# Patient Record
Sex: FEMALE | Race: WHITE | Employment: OTHER | URBAN - METROPOLITAN AREA
[De-identification: names, ages, dates, MRNs, and addresses within clinical notes are randomized per-mention and may not be internally consistent; named-entity substitution may affect disease eponyms.]

---

## 2020-07-01 ENCOUNTER — TELEPHONE (OUTPATIENT)
Dept: SURGERY | Facility: CLINIC | Age: 65
End: 2020-07-01

## 2020-07-01 DIAGNOSIS — Z95.828 MRI-SAFE ENDOVASCULAR ANEURYSM COIL PRESENT: ICD-10-CM

## 2020-07-01 DIAGNOSIS — I67.1 CEREBRAL ANEURYSM: Primary | ICD-10-CM

## 2020-07-01 NOTE — TELEPHONE ENCOUNTER
LOV 9/2016; had coiling; right eye blurry, blood behind the eye? Is this something Dr. Zion Felipe would follow for her?  Please advise

## 2020-07-01 NOTE — TELEPHONE ENCOUNTER
Per Dr. Julieta Lao on 9/13/16 at office visit:    [address] [facility]      1. 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   I have asked her to return to the office at the one-year level, at the end of April, with a rep

## 2020-07-01 NOTE — TELEPHONE ENCOUNTER
Spoke with phone # below provided by patient. Facility is [facility]. Fax #:[phone]    Order faxed to [facility].

## 2020-07-01 NOTE — TELEPHONE ENCOUNTER
Has she been evaluated by ophthalmology? She would be due for follow up MRA head with and without contrast.  Please follow up with patient and inquire if she has any imaging from [place].  I will place orders for MRA and patient can be scheduled for follow

## 2020-07-01 NOTE — TELEPHONE ENCOUNTER
Pt calling stating she'd like to go to [facility] in [facility], phone # [phone]

## 2020-07-01 NOTE — TELEPHONE ENCOUNTER
Called patient regarding information in message from ANI Mazariegos. Patient stated that:    -she did not obtain any imaging or have any testing completed in [place].    -she did want providers to know that she wakes up daily with vertigo/dizziness.

## 2020-07-06 NOTE — TELEPHONE ENCOUNTER
Received fax from [facility] stating: Our MRI Dept.  Said that this MRA should just be ordered without contrast. Can you please adjust the order to be without contrast?\"    Will forward the above message on to SUNIL Tai to see if the MRA order shou 10

## 2020-07-16 NOTE — TELEPHONE ENCOUNTER
Ongoing SW/CM Assessment/Plan of Care Note     See SW/CM flowsheets for goals and other objective data.    Patient/Family discharge goal (s):  Goal #1: Adjustment/coping issues addressed  Goal #2: Communication facilitated  Goal #3: Extended Care Facility discharge arranged    PT Recommendation:  Recommendation for Discharge: PT: Sub-acute nursing home, Less intensive rehab    OT Recommendation:  Recommendations for Discharge: OT: Sub-acute nursing home, Less intensive rehab    SLP Recommendation:  Recommendations for Discharge: SLP: Post [facility] services    Disposition:  Planned Discharge Destination: Rehabilitation/Skilled Care    Progress note:   Nephrology continues to follow patient.  Patient continues to requires supplemental magnesium, working with PT and OT. Tentative discharge back to [facility] [place] tomorrow.            Attempt made to call patient back. She may have imaging done at the location of her choice.   We are just waiting for IR to adjust imaging to state without contrast    Routed TE again to provider to adjust imaging order to state w/o contrast

## 2020-07-16 NOTE — TELEPHONE ENCOUNTER
Pt states she is unsure if MRA was schedule at [facility], if  is too much trouble pt states she can go foward with MRA here at [facility].

## 2020-07-29 ENCOUNTER — HOSPITAL ENCOUNTER (OUTPATIENT)
Dept: MRI IMAGING | Facility: HOSPITAL | Age: 65
Discharge: HOME | End: 2020-07-29
Attending: PHYSICIAN ASSISTANT
Payer: MEDICARE

## 2020-07-29 DIAGNOSIS — Z95.828 MRI-SAFE ENDOVASCULAR ANEURYSM COIL PRESENT: ICD-10-CM

## 2020-07-29 DIAGNOSIS — I67.1 CEREBRAL ANEURYSM: ICD-10-CM

## 2020-07-29 LAB — CREAT BLD-MCNC: 0.7 MG/DL (ref 0.55–1.02)

## 2020-07-29 PROCEDURE — A9575 INJ GADOTERATE MEGLUMI 0.1ML: HCPCS | Performed by: PHYSICIAN ASSISTANT

## 2020-07-29 PROCEDURE — 70546 MR ANGIOGRAPH HEAD W/O&W/DYE: CPT | Performed by: NURSE PRACTITIONER

## 2020-07-29 PROCEDURE — 82565 ASSAY OF CREATININE: CPT

## 2020-07-29 NOTE — TELEPHONE ENCOUNTER
Per provider in 7/29/20 message:    \"Katia Mariano Prom, APRN  [address] Nurse; [address] Front Office   Hi patient completed MRA brain.  She needs follow up with Dr. Carlos Timmons to review results.  Thanks\"

## 2020-08-17 ENCOUNTER — TELEPHONE (OUTPATIENT)
Dept: SURGERY | Facility: CLINIC | Age: 65
End: 2020-08-17

## 2020-08-17 ENCOUNTER — OFFICE VISIT (OUTPATIENT)
Dept: SURGERY | Facility: CLINIC | Age: 65
End: 2020-08-17
Payer: MEDICARE

## 2020-08-17 VITALS — SYSTOLIC BLOOD PRESSURE: 126 MMHG | DIASTOLIC BLOOD PRESSURE: 80 MMHG | HEART RATE: 74 BPM

## 2020-08-17 DIAGNOSIS — H53.9 VISION DISTURBANCE: Primary | ICD-10-CM

## 2020-08-17 DIAGNOSIS — Z95.828 MRI-SAFE ENDOVASCULAR ANEURYSM COIL PRESENT: ICD-10-CM

## 2020-08-17 DIAGNOSIS — R26.9 GAIT DISTURBANCE: ICD-10-CM

## 2020-08-17 DIAGNOSIS — I67.1 CEREBRAL ANEURYSM: ICD-10-CM

## 2020-08-17 PROCEDURE — 99214 OFFICE O/P EST MOD 30 MIN: CPT | Performed by: RADIOLOGY

## 2020-08-17 RX ORDER — AMLODIPINE BESYLATE 5 MG/1
5 TABLET ORAL DAILY
Qty: 90 TABLET | Refills: 0 | Status: SHIPPED | OUTPATIENT
Start: 2020-08-17

## 2020-08-17 RX ORDER — ASPIRIN 81 MG/1
81 TABLET, CHEWABLE ORAL DAILY
Qty: 90 TABLET | Refills: 3 | Status: SHIPPED | OUTPATIENT
Start: 2020-08-17

## 2020-08-17 RX ORDER — LEVOTHYROXINE SODIUM 0.15 MG/1
150 TABLET ORAL DAILY
Qty: 90 TABLET | Refills: 0 | Status: SHIPPED | OUTPATIENT
Start: 2020-08-17

## 2020-08-17 RX ORDER — AMLODIPINE BESYLATE 5 MG/1
1 TABLET ORAL DAILY
COMMUNITY
End: 2020-08-17

## 2020-08-17 RX ORDER — LEVOTHYROXINE SODIUM 0.15 MG/1
1 TABLET ORAL DAILY
COMMUNITY
End: 2020-08-17

## 2020-08-17 NOTE — TELEPHONE ENCOUNTER
Please place reminder for MRA head with and without contrast for follow up of previously stent/coiled L PICA aneurysm in 2 years (August 2022)    Patient should return to clinic to review results.     Thanks

## 2020-08-17 NOTE — PROGRESS NOTES
[facility]  Interventional Neuroradiology Progress Note    Rigo Huggins     1955 MRN [id number]   [address], [address] PCP Janet Parrish MD     Subjective:   We had the pleasure of seeing Justyn Romero vertigo  +imbalance  +falls, as described above  The patient denies associated headaches, neck pain, nausea, or vomiting.   The patient denies cranial nerve symptoms such as diplopia, photophobia, ptosis, facial weakness/paresthesias, hearing loss, tinnitus imaging in 2 years (August 2022), MRA with and without contrast  Patient should RESUME taking ASA 81 mg daily  We reviewed overall lifestyle modifications including a healthy diet with more lean protein, fresh fruits and vegetables and less processed foods

## 2020-08-17 NOTE — PROGRESS NOTES
MRA head obtained     Pt states she has vertigo. Pt states she broke her hip and back due to falls. Pt states she does not have frequent falls.  2 years ago she had blurred vision which she was referred to for neurlogist.     Review of Systems:    Hand Alma

## 2020-09-04 ENCOUNTER — TELEPHONE (OUTPATIENT)
Dept: SURGERY | Facility: CLINIC | Age: 65
End: 2020-09-04

## 2020-09-04 NOTE — TELEPHONE ENCOUNTER
per patient Dr Dorie Chen requested a fax # for [facility]  in [address] [place] [address].      Fax # per patient is [phone]

## 2020-09-24 ENCOUNTER — TELEPHONE (OUTPATIENT)
Dept: SURGERY | Facility: CLINIC | Age: 65
End: 2020-09-24

## 2020-09-24 NOTE — TELEPHONE ENCOUNTER
Request for information from coils and stenting noted and report from 4/29/16 printed. Called [facility] and obtained fax number for [employer] at [facility]. Faxed documents to:    [phone]    [facility] for the call and the call was ended.

## 2020-09-24 NOTE — TELEPHONE ENCOUNTER
Dalton Vides from [facility] ([phone]) to do the imaging. Pt told them she has coils placed. They need to have: make of the coils model name and model number, date it was performed, who performed it, and where it was done.  They have

## 2020-10-27 NOTE — TELEPHONE ENCOUNTER
Per ANI Moody on 8/17/20:    \"Plan:   We will plan to repeat imaging in 2 years (August 2022), MRA with and without contrast  Patient should RESUME taking ASA 81 mg daily  We reviewed overall lifestyle modifications including a healthy diet with

## 2020-10-27 NOTE — TELEPHONE ENCOUNTER
Called [facility] to request imaging results. Obtained fax numbers:    Medical Records fax:  [phone]  Radiology fax:  [phone] request for records to be sent to [facility].      After multiple fax attempts to [facility] Medical Records, called fac

## 2020-10-28 ENCOUNTER — TELEPHONE (OUTPATIENT)
Dept: SURGERY | Facility: CLINIC | Age: 65
End: 2020-10-28

## 2020-10-28 NOTE — TELEPHONE ENCOUNTER
Report only of MRA Neck dated 09/29/20 & report only of MRI Brain WWO dated 09/29/20 received, endorsed to provider for review

## 2020-10-29 NOTE — TELEPHONE ENCOUNTER
Only report was received. Still waiting on CD to be mailed to the office by [facility] as Ángel Few will need to review the images. Per TE 8/17/2020 patient also needs an appointment to review results.     Patient informed of the above information and underst

## 2020-11-03 NOTE — TELEPHONE ENCOUNTER
Called patient to notify her that we have not received the CD. She will reach back out to medical records to see if they have mailed it and if not to do so.

## 2020-11-03 NOTE — TELEPHONE ENCOUNTER
Spoke with patient who returned call.   Patient stated that:    -she was told that the imaging disc was mailed on 10/28/20 to [facility].   -she would like a callback once it has been received.   -if disc does arrive prior to appointment she would like to change h

## 2020-11-03 NOTE — TELEPHONE ENCOUNTER
Please have the patient mail the disc. Once we are able to review imaging, not just a written report we can follow up with patient for tele visit.

## 2020-11-04 NOTE — TELEPHONE ENCOUNTER
Rcvd MRA neck report and disc dated 9/29/20 w/ MRI brain dated 9/29/20. Uploaded disc into PACS, and scanning reports. Endorsed to nurses.

## 2020-11-16 ENCOUNTER — VIRTUAL PHONE E/M (OUTPATIENT)
Dept: SURGERY | Facility: CLINIC | Age: 65
End: 2020-11-16
Payer: COMMERCIAL

## 2020-11-16 DIAGNOSIS — R26.9 GAIT DISTURBANCE: ICD-10-CM

## 2020-11-16 DIAGNOSIS — Z95.828 MRI-SAFE ENDOVASCULAR ANEURYSM COIL PRESENT: ICD-10-CM

## 2020-11-16 DIAGNOSIS — I67.1 CEREBRAL ANEURYSM: ICD-10-CM

## 2020-11-16 DIAGNOSIS — H53.9 VISION DISTURBANCE: Primary | ICD-10-CM

## 2020-11-16 PROCEDURE — 99211 OFF/OP EST MAY X REQ PHY/QHP: CPT | Performed by: RADIOLOGY

## 2020-11-16 NOTE — PROGRESS NOTES
Virtual Telephone Check-In    Patrick Sanders verbally consents to a Virtual/Telephone Check-In visit on 11/16/20. Patient has been referred to the [facility] website at [email or web address] to review the yearly Consent to Treat document.     Patient unde

## 2022-08-08 ENCOUNTER — TELEPHONE (OUTPATIENT)
Dept: SURGERY | Facility: CLINIC | Age: 67
End: 2022-08-08

## 2022-08-08 DIAGNOSIS — I67.1 INTRACRANIAL ANEURYSM: Primary | ICD-10-CM

## 2022-08-08 NOTE — TELEPHONE ENCOUNTER
Per patient reminder \"Please place reminder for MRA head with and without contrast for follow up of previously stent/coiled L PICA aneurysm in 2 years (August 2022) \"    MRA order needed

## 2022-08-09 NOTE — TELEPHONE ENCOUNTER
Order placed. Please advise patient she will need to make a follow up apt with Dr. Jose J Banks to review results. Also, if she obtains imaging at an outside facility we will need the disc uploaded to our system at least 24 hr prior to her apt. Thank you!

## 2022-10-25 ENCOUNTER — TELEPHONE (OUTPATIENT)
Dept: SURGERY | Facility: CLINIC | Age: 67
End: 2022-10-25

## 2022-11-01 ENCOUNTER — VIRTUAL PHONE E/M (OUTPATIENT)
Dept: SURGERY | Facility: CLINIC | Age: 67
End: 2022-11-01
Payer: MEDICARE

## 2022-11-01 DIAGNOSIS — I67.1 INTRACRANIAL ANEURYSM: Primary | ICD-10-CM

## 2022-11-01 PROCEDURE — 99441 PHONE E/M BY PHYS 5-10 MIN: CPT | Performed by: RADIOLOGY

## 2022-11-01 RX ORDER — ATORVASTATIN CALCIUM 20 MG/1
20 TABLET, FILM COATED ORAL NIGHTLY
COMMUNITY

## 2022-11-01 RX ORDER — LEVOTHYROXINE SODIUM 0.12 MG/1
125 TABLET ORAL
COMMUNITY